# Patient Record
Sex: MALE | Race: WHITE | NOT HISPANIC OR LATINO | Employment: FULL TIME | URBAN - METROPOLITAN AREA
[De-identification: names, ages, dates, MRNs, and addresses within clinical notes are randomized per-mention and may not be internally consistent; named-entity substitution may affect disease eponyms.]

---

## 2023-04-11 ENCOUNTER — APPOINTMENT (OUTPATIENT)
Dept: PRIMARY CARE | Facility: CLINIC | Age: 28
End: 2023-04-11
Payer: COMMERCIAL

## 2025-03-13 ENCOUNTER — OFFICE VISIT (OUTPATIENT)
Dept: URGENT CARE | Age: 30
End: 2025-03-13
Payer: COMMERCIAL

## 2025-03-13 VITALS
HEART RATE: 81 BPM | OXYGEN SATURATION: 97 % | DIASTOLIC BLOOD PRESSURE: 85 MMHG | WEIGHT: 260.4 LBS | SYSTOLIC BLOOD PRESSURE: 127 MMHG | TEMPERATURE: 98.1 F

## 2025-03-13 DIAGNOSIS — J02.9 SORETHROAT: ICD-10-CM

## 2025-03-13 DIAGNOSIS — J02.0 STREP THROAT: Primary | ICD-10-CM

## 2025-03-13 LAB — POC RAPID STREP: POSITIVE

## 2025-03-13 RX ORDER — AMOXICILLIN 500 MG/1
1000 CAPSULE ORAL EVERY 12 HOURS SCHEDULED
Qty: 40 CAPSULE | Refills: 0 | Status: SHIPPED | OUTPATIENT
Start: 2025-03-13 | End: 2025-03-23

## 2025-03-13 ASSESSMENT — ENCOUNTER SYMPTOMS
CARDIOVASCULAR NEGATIVE: 1
CHILLS: 1
MUSCULOSKELETAL NEGATIVE: 1
RESPIRATORY NEGATIVE: 1
FATIGUE: 1
FEVER: 1
PSYCHIATRIC NEGATIVE: 1
NEUROLOGICAL NEGATIVE: 1
SORE THROAT: 1

## 2025-03-13 NOTE — PATIENT INSTRUCTIONS
Signs, symptoms, examination, and rapid testing consistent with  strep pharyngitis. Patient is without evidence of PTA, deep neck infection, Ludwigs angina, or sepsis. Patient will be treated with antibiotics and symptomatic support: salt water gargles, warm tea, chloraseptic spray, tylenol/motrin. Patient is encouraged to return if symptoms worsen or do not improve. Otherwise follow with PCP.

## 2025-03-13 NOTE — PROGRESS NOTES
Subjective   Patient ID: Reji Hinojosa is a 29 y.o. male. They present today with a chief complaint of Sore Throat (X2days ).        Past Medical History  Allergies as of 03/13/2025    (No Known Allergies)       (Not in a hospital admission)       History reviewed. No pertinent past medical history.    History reviewed. No pertinent surgical history.         Review of Systems  Review of Systems   Constitutional:  Positive for chills, fatigue and fever.   HENT:  Positive for sore throat.    Respiratory: Negative.     Cardiovascular: Negative.    Musculoskeletal: Negative.    Neurological: Negative.    Psychiatric/Behavioral: Negative.                                    Objective    Vitals:    03/13/25 1307   BP: 127/85   Pulse: 81   Temp: 36.7 °C (98.1 °F)   SpO2: 97%   Weight: 118 kg (260 lb 6.4 oz)     No LMP for male patient.    Physical Exam  General: Vitals noted, ill appearing, no distress, afebrile. Normal phonation, no stridor, no trismus  ENT: TMs clear bilaterally, EACs unremarkable. Mastoids nontender. Posterior oropharynx erythema, exudate and tonsillar swelling. Uvula is in the midline and non-edematous. No Mike's angina.  Neck: Supple. No meningismus through full range of motion. Anterior cervical lymphadenopathy.   Cardiac: Regular rate and rhythm, no murmur.  Lungs: Good aeration throughout. No adventitious breath sounds.   Abdomen: Soft, nontender, nonsurgical throughout. Normoactive bowel sounds.   Extremities: No peripheral edema  Skin: No rash  Neuro: No focal neurologic deficits.     Procedures    Point of Care Test & Imaging Results from this visit  Results for orders placed or performed in visit on 03/13/25   POCT rapid strep A manually resulted   Result Value Ref Range    POC Rapid Strep Positive (A) Negative      No results found.    Diagnostic study results (if any) were reviewed by BINA Richey-KODAK.    Assessment/Plan   Allergies, medications, history, and pertinent  labs/EKGs/Imaging reviewed by TANMAY Richey.     Medical Decision Making  Signs, symptoms, examination, and rapid testing consistent with  strep pharyngitis. Patient is without evidence of PTA, deep neck infection, Ludwigs angina, or sepsis. Patient will be treated with antibiotics and symptomatic support: salt water gargles, warm tea, chloraseptic spray, tylenol/motrin. Patient is encouraged to return if symptoms worsen or do not improve. Otherwise follow with PCP.      Orders and Diagnoses  Diagnoses and all orders for this visit:  Strep throat  -     amoxicillin (Amoxil) 500 mg capsule; Take 2 capsules (1,000 mg) by mouth every 12 hours for 10 days.  Sorethroat  -     POCT rapid strep A manually resulted      Medical Admin Record      Patient disposition: Home    Electronically signed by TANMAY Richey  1:29 PM

## 2025-05-02 ENCOUNTER — APPOINTMENT (OUTPATIENT)
Dept: PRIMARY CARE | Facility: CLINIC | Age: 30
End: 2025-05-02
Payer: COMMERCIAL

## 2025-05-02 VITALS — BODY MASS INDEX: 36.73 KG/M2 | WEIGHT: 256 LBS | SYSTOLIC BLOOD PRESSURE: 123 MMHG | DIASTOLIC BLOOD PRESSURE: 73 MMHG

## 2025-05-02 DIAGNOSIS — Z00.00 HEALTH CARE MAINTENANCE: Primary | ICD-10-CM

## 2025-05-02 PROCEDURE — 99385 PREV VISIT NEW AGE 18-39: CPT | Performed by: INTERNAL MEDICINE

## 2025-05-02 NOTE — PROGRESS NOTES
Subjective   Patient ID: Reji Hinojosa is a 29 y.o. male who presents for No chief complaint on file..    HPI patient for first time septated Franchi CPE no chest pain no shortness of breath no side effect with medication has not seen position in many years Works at [employer] doing okay some exercise there and playing basketball bowels normal no dysuria    Past medical history unremarkable    Medications none at the current time    Allergies no known drug allergies    Social history no tobacco alcohol social    Family history father diabetes    Prevention some exercise no recent blood work    Review of Systems    Objective   /73   Wt 116 kg (256 lb)   BMI 36.73 kg/m²     Physical Exam  Vital signs noted alert and oriented x 3 NCAT no JVD or bruit chest clear to auscultation CV regular rate and rhythm S1-S2 abdomen soft obese nontender normal active bowel sounds LS spine normal curvature negative straight leg raise extremities no clubbing cyanosis or edema normal distal pulses DTR 2+ PERRLA EOMI nares without discharge OP benign TM normal bilateral EAC clear bilateral no AC nodes no thyromegaly  Assessment/Plan    impression good general health  Plan other diagnoses  Okay to check Chem-7 advised on glucose potassium and kidney function check CBC advised on blood count check lipid panel advised on cholesterol profile watch overall diet regular exercise good water consumption care and safety in the home in the home review prior findings and then recheck as needed and for regular visit TT 50 cc 26

## 2025-05-03 LAB
ANION GAP SERPL CALCULATED.4IONS-SCNC: 11 MMOL/L (CALC) (ref 7–17)
BUN SERPL-MCNC: 10 MG/DL (ref 7–25)
BUN/CREAT SERPL: NORMAL (CALC) (ref 6–22)
CALCIUM SERPL-MCNC: 9.6 MG/DL (ref 8.6–10.3)
CHLORIDE SERPL-SCNC: 104 MMOL/L (ref 98–110)
CHOLEST SERPL-MCNC: 236 MG/DL
CHOLEST/HDLC SERPL: 6.6 (CALC)
CO2 SERPL-SCNC: 25 MMOL/L (ref 20–32)
CREAT SERPL-MCNC: 0.88 MG/DL (ref 0.6–1.24)
EGFRCR SERPLBLD CKD-EPI 2021: 119 ML/MIN/1.73M2
ERYTHROCYTE [DISTWIDTH] IN BLOOD BY AUTOMATED COUNT: 13.3 % (ref 11–15)
GLUCOSE SERPL-MCNC: 85 MG/DL (ref 65–99)
HCT VFR BLD AUTO: 44.6 % (ref 38.5–50)
HDLC SERPL-MCNC: 36 MG/DL
HGB BLD-MCNC: 14.8 G/DL (ref 13.2–17.1)
LDLC SERPL CALC-MCNC: 155 MG/DL (CALC)
MCH RBC QN AUTO: 26.6 PG (ref 27–33)
MCHC RBC AUTO-ENTMCNC: 33.2 G/DL (ref 32–36)
MCV RBC AUTO: 80.2 FL (ref 80–100)
NONHDLC SERPL-MCNC: 200 MG/DL (CALC)
PLATELET # BLD AUTO: 319 THOUSAND/UL (ref 140–400)
PMV BLD REES-ECKER: 10.4 FL (ref 7.5–12.5)
POTASSIUM SERPL-SCNC: 4.3 MMOL/L (ref 3.5–5.3)
RBC # BLD AUTO: 5.56 MILLION/UL (ref 4.2–5.8)
SODIUM SERPL-SCNC: 140 MMOL/L (ref 135–146)
TRIGL SERPL-MCNC: 273 MG/DL
WBC # BLD AUTO: 9.6 THOUSAND/UL (ref 3.8–10.8)